# Patient Record
Sex: MALE | Employment: OTHER | URBAN - NONMETROPOLITAN AREA
[De-identification: names, ages, dates, MRNs, and addresses within clinical notes are randomized per-mention and may not be internally consistent; named-entity substitution may affect disease eponyms.]

---

## 2024-04-01 ENCOUNTER — OFFICE VISIT (OUTPATIENT)
Age: 69
End: 2024-04-01
Payer: COMMERCIAL

## 2024-04-01 VITALS
RESPIRATION RATE: 20 BRPM | DIASTOLIC BLOOD PRESSURE: 66 MMHG | HEART RATE: 77 BPM | SYSTOLIC BLOOD PRESSURE: 132 MMHG | OXYGEN SATURATION: 97 % | WEIGHT: 201.6 LBS

## 2024-04-01 DIAGNOSIS — I10 HYPERTENSION, UNSPECIFIED TYPE: ICD-10-CM

## 2024-04-01 DIAGNOSIS — D49.7 ADRENAL TUMOR: Primary | ICD-10-CM

## 2024-04-01 PROCEDURE — 1123F ACP DISCUSS/DSCN MKR DOCD: CPT | Performed by: INTERNAL MEDICINE

## 2024-04-01 PROCEDURE — 3075F SYST BP GE 130 - 139MM HG: CPT | Performed by: INTERNAL MEDICINE

## 2024-04-01 PROCEDURE — 3078F DIAST BP <80 MM HG: CPT | Performed by: INTERNAL MEDICINE

## 2024-04-01 PROCEDURE — 99204 OFFICE O/P NEW MOD 45 MIN: CPT | Performed by: INTERNAL MEDICINE

## 2024-04-01 RX ORDER — OMEPRAZOLE 20 MG/1
20 CAPSULE, DELAYED RELEASE ORAL DAILY
COMMUNITY

## 2024-04-01 RX ORDER — METOPROLOL SUCCINATE 50 MG/1
1 TABLET, EXTENDED RELEASE ORAL
COMMUNITY

## 2024-04-01 RX ORDER — LATANOPROST 50 UG/ML
1 SOLUTION/ DROPS OPHTHALMIC NIGHTLY
COMMUNITY
Start: 2023-04-12

## 2024-04-01 RX ORDER — AMLODIPINE BESYLATE 10 MG/1
10 TABLET ORAL DAILY
COMMUNITY
Start: 2024-01-12

## 2024-04-01 RX ORDER — LOSARTAN POTASSIUM 100 MG/1
100 TABLET ORAL DAILY
COMMUNITY
Start: 2024-01-12

## 2024-04-01 RX ORDER — TIMOLOL MALEATE 5 MG/ML
1 SOLUTION/ DROPS OPHTHALMIC 2 TIMES DAILY
COMMUNITY
Start: 2023-10-26

## 2024-04-01 RX ORDER — CIPROFLOXACIN 500 MG/1
500 TABLET, FILM COATED ORAL 2 TIMES DAILY
COMMUNITY
Start: 2024-03-27

## 2024-04-01 RX ORDER — SILDENAFIL 50 MG/1
1 TABLET, FILM COATED ORAL DAILY PRN
COMMUNITY

## 2024-04-01 RX ORDER — NAPROXEN 250 MG/1
400 TABLET ORAL DAILY
COMMUNITY

## 2024-04-01 RX ORDER — HYDROCHLOROTHIAZIDE 25 MG/1
1 TABLET ORAL
COMMUNITY

## 2024-04-01 ASSESSMENT — PATIENT HEALTH QUESTIONNAIRE - PHQ9
SUM OF ALL RESPONSES TO PHQ QUESTIONS 1-9: 1
SUM OF ALL RESPONSES TO PHQ QUESTIONS 1-9: 1
2. FEELING DOWN, DEPRESSED OR HOPELESS: SEVERAL DAYS
SUM OF ALL RESPONSES TO PHQ QUESTIONS 1-9: 1
SUM OF ALL RESPONSES TO PHQ9 QUESTIONS 1 & 2: 1
1. LITTLE INTEREST OR PLEASURE IN DOING THINGS: NOT AT ALL
SUM OF ALL RESPONSES TO PHQ QUESTIONS 1-9: 1

## 2024-04-01 NOTE — PROGRESS NOTES
[facility] PHYSICIANS [place] SPECIALTY  [facility] ENDOCRINOLOGY  [address] [facility] [address]  [facility] [address]  Dept: [phone]  Loc: [phone]     Visit Date: 4/1/2024    Javier Kennedy is a 68 y.o. male who presents today for:  Chief Complaint   Patient presents with    Adenoma     Left Adrenal Gland- New Patient    Hypertension     Uncontrolled             Subjective:      HPI     Javier Kennedy is a 68 y.o. , male who comes for Initial visit for adrenal tumor.  No primary care provider on file.  Javier Kennedy was diagnosed with adrenal tumor 3 months ago on a CT scan of the abdomen which showed a hypodense lesion 3 lesion in the left adrenal gland measuring 1 x 0.8 cm, likely representing an adenoma.  The patient was diagnosed with hypertension about the same time.  He is requiring multiple medications including amlodipine 10 mg, hydrochlorothiazide 25 mg, Cozaar 100 mg and metoprolol 50 mg daily be extended release formulation.  He denies any changes of his weight.  The patient denies easy bruising of the skin.  No stretch marks noted.  There is no depression.  No history of hypokalemia.  Patient denies headaches, palpitations, tremors and excessive sweatiness.      History reviewed. No pertinent past medical history.   History reviewed. No pertinent surgical history.    History reviewed. No pertinent family history.  Social History     Tobacco Use    Smoking status: Former     Current packs/day: 1.00     Average packs/day: 1 pack/day for 50.2 years (50.2 ttl pk-yrs)     Types: Cigarettes     Start date: 1974    Smokeless tobacco: Current     Types: Snuff   Substance Use Topics    Alcohol use: Not on file      Current Outpatient Medications   Medication Sig Dispense Refill    amLODIPine (NORVASC) 10 MG tablet Take 1 tablet by mouth daily      hydroCHLOROthiazide (HYDRODIURIL) 25 MG tablet Take 1 tablet by mouth Every Day      latanoprost (XALATAN) 0.005 % ophthalmic solution Apply 1 drop to eye nightly

## 2024-06-18 ENCOUNTER — INITIAL CONSULT (OUTPATIENT)
Dept: RADIATION ONCOLOGY | Age: 69
End: 2024-06-18
Payer: MEDICARE

## 2024-06-18 VITALS
TEMPERATURE: 97.2 F | DIASTOLIC BLOOD PRESSURE: 84 MMHG | HEIGHT: 65 IN | HEART RATE: 90 BPM | RESPIRATION RATE: 18 BRPM | SYSTOLIC BLOOD PRESSURE: 157 MMHG | OXYGEN SATURATION: 94 % | WEIGHT: 190.04 LBS | BODY MASS INDEX: 31.66 KG/M2

## 2024-06-18 DIAGNOSIS — C61 MALIGNANT NEOPLASM OF PROSTATE (HCC): Primary | ICD-10-CM

## 2024-06-18 PROCEDURE — 1123F ACP DISCUSS/DSCN MKR DOCD: CPT | Performed by: RADIOLOGY

## 2024-06-18 PROCEDURE — 99205 OFFICE O/P NEW HI 60 MIN: CPT | Performed by: RADIOLOGY

## 2024-06-18 NOTE — PROGRESS NOTES
[facility]          Radiation Oncology     [address] [address]  Phone: [phone] - Option 2  Fax:[phone]               INITIAL CONSULTATION    Date of Service: 2024  Patient ID: Javier Kennedy   : 1955  MRN: [id number]   Acct Number:        Requesting Provider: Dr. Josh Walker     Reason for request: Prostate cancer      CONSULTANT: Jackie Linton PhD MD      CHIEF COMPLAINT: Prostate cancer  DIAGNOSIS: C61 -- Malignant neoplasm of the prostate; Adenocarcinoma, Myriam Score 4+5=9, Grade Group 5; PSA 8.44; cT1c N0 M0, Stage IIIC, \"very high risk\" (> 4 biopsy cores with Grade Group 4 or 5)  Date of diagnosis: 3/29/2024      ASSESSMENT:  Adenocarcinoma of the prostate, \"very high risk\" as shown above.  The diagnosis including AJCC staging was reviewed.  Jaiver received a copy of his AJCC staging information.  Treatment options and recommendations including current clinical practice guidelines (NCCN) were reviewed in detail.  We reviewed the recommendations for initial diagnostic workup, risk stratification and treatment options.  The category 1 treatment option is definitive radiation therapy with 1.5 to 3 years androgen deprivation therapy.  We reviewed the recommendations for monitoring after initial treatment with the caution that the PSA will be highly suppressed due to ADT and the final result of the treatment will require long-term follow-up.  We reviewed the \"principles of radiation therapy\" section in the guideline describing the need for precision targeting, recommendation for image guided radiation therapy with fiducial marker placement and recommendation for use of a prerectal spacer for improved rectal sparing so long as there is no posterior extraprostatic extension of malignancy.  We reviewed the various treatment schedules.  Javier received a copy of the clinical practice

## 2024-06-19 ENCOUNTER — SOCIAL WORK (OUTPATIENT)
Dept: ONCOLOGY | Age: 69
End: 2024-06-19

## 2024-06-19 NOTE — PROGRESS NOTES
Oncology Social Work    Date: 6/19/2024  Time: 4:15 PM  Name: Javier Kennedy  MRN: [id number]     Contact Type: Follow-up    Note:   Situation: This staff called Javier Kennedy via phone support to introduce myself as his Oncology Social Worker.     Background:  Javier had his recent appointment at the [facility]. This staff was calling to review the Distress Thermometer he completed during this encounter.     Assessment: This staff had the opportunity to speak with Javier. He appeared very pleasant as we discussed the treatment process. He explained that he has some financial questions and is concerned about all the co-pays he is now responsible for. He just went on Medicare in May and wants to talk to his  about a better plan considering the circumstances. This staff also offered the Financial Assistance through the hospital and explained how the payment plan process could be of benefit to him should his bills come to that.   - A quick review and willingness to assist in completing his Power of  document was provided since he doesn't have the documents on file.  - Additional education regarding the services provided by the SW were explained during our conversation.   - Referred him to his area [employer] ([facility]) for additional support should he want to pursue that avenue.    Recommendation: Follow-up will be initiated by Javier based on need.  provided him with my contact information and will remain available for support.        ИРИНА Mccarthy, CELIO, BRAD  Oncology Social Worker      Electronically signed by ИРИНА Mccarthy LSW, ACHP-SW on 6/19/2024 at 4:15 PM

## 2024-06-24 DIAGNOSIS — C61 PROSTATE CANCER (HCC): Primary | ICD-10-CM

## 2024-07-09 PROBLEM — C61 MALIGNANT NEOPLASM OF PROSTATE (HCC): Status: ACTIVE | Noted: 2024-07-09

## 2024-07-15 ENCOUNTER — HOSPITAL ENCOUNTER (OUTPATIENT)
Dept: MRI IMAGING | Age: 69
Discharge: HOME | End: 2024-07-15
Attending: RADIOLOGY
Payer: MEDICARE

## 2024-07-15 DIAGNOSIS — C61 PROSTATE CANCER (HCC): ICD-10-CM

## 2024-07-15 LAB — POC CREATININE WHOLE BLOOD: 0.9 MG/DL (ref 0.5–1.2)

## 2024-07-15 PROCEDURE — 76377 3D RENDER W/INTRP POSTPROCES: CPT

## 2024-07-15 PROCEDURE — 6360000004 HC RX CONTRAST MEDICATION: Performed by: RADIOLOGY

## 2024-07-15 PROCEDURE — A9579 GAD-BASE MR CONTRAST NOS,1ML: HCPCS | Performed by: RADIOLOGY

## 2024-07-15 PROCEDURE — 82565 ASSAY OF CREATININE: CPT

## 2024-07-15 PROCEDURE — 72197 MRI PELVIS W/O & W/DYE: CPT

## 2024-07-15 RX ADMIN — GADOTERIDOL 15 ML: 279.3 INJECTION, SOLUTION INTRAVENOUS at 19:26

## 2024-07-23 ENCOUNTER — TELEPHONE (OUTPATIENT)
Dept: RADIATION ONCOLOGY | Age: 69
End: 2024-07-23

## 2024-07-23 DIAGNOSIS — C61 MALIGNANT NEOPLASM OF PROSTATE (HCC): Primary | ICD-10-CM

## 2024-07-24 RX ORDER — OXYCODONE HYDROCHLORIDE AND ACETAMINOPHEN 5; 325 MG/1; MG/1
1 TABLET ORAL EVERY 6 HOURS PRN
Qty: 2 TABLET | Refills: 0 | Status: SHIPPED | OUTPATIENT
Start: 2024-07-24 | End: 2024-07-25

## 2024-07-24 RX ORDER — LORAZEPAM 0.5 MG/1
TABLET ORAL
Qty: 2 TABLET | Refills: 0 | Status: SHIPPED | OUTPATIENT
Start: 2024-07-24 | End: 2024-09-02

## 2024-07-24 RX ORDER — CIPROFLOXACIN 500 MG/1
500 TABLET, FILM COATED ORAL EVERY 12 HOURS
Qty: 6 TABLET | Refills: 0 | Status: SHIPPED | OUTPATIENT
Start: 2024-07-24 | End: 2024-07-27

## 2025-03-11 ENCOUNTER — HOSPITAL ENCOUNTER (OUTPATIENT)
Dept: CT IMAGING | Age: 70
Discharge: HOME | End: 2025-03-11
Attending: RADIOLOGY

## 2025-03-11 DIAGNOSIS — Z00.6 ENCOUNTER FOR EXAMINATION FOR NORMAL COMPARISON OR CONTROL IN CLINICAL RESEARCH PROGRAM: ICD-10-CM

## 2025-03-17 ENCOUNTER — OFFICE VISIT (OUTPATIENT)
Dept: RADIATION ONCOLOGY | Age: 70
End: 2025-03-17
Payer: MEDICARE

## 2025-03-17 VITALS
OXYGEN SATURATION: 96 % | TEMPERATURE: 97.5 F | SYSTOLIC BLOOD PRESSURE: 132 MMHG | DIASTOLIC BLOOD PRESSURE: 78 MMHG | BODY MASS INDEX: 30.66 KG/M2 | RESPIRATION RATE: 20 BRPM | WEIGHT: 184 LBS | HEART RATE: 90 BPM

## 2025-03-17 DIAGNOSIS — C61 MALIGNANT NEOPLASM OF PROSTATE (HCC): Primary | ICD-10-CM

## 2025-03-17 PROCEDURE — 1159F MED LIST DOCD IN RCRD: CPT | Performed by: RADIOLOGY

## 2025-03-17 PROCEDURE — 99215 OFFICE O/P EST HI 40 MIN: CPT | Performed by: RADIOLOGY

## 2025-03-17 PROCEDURE — 1126F AMNT PAIN NOTED NONE PRSNT: CPT | Performed by: RADIOLOGY

## 2025-03-17 PROCEDURE — 1123F ACP DISCUSS/DSCN MKR DOCD: CPT | Performed by: RADIOLOGY

## 2025-03-17 RX ORDER — ATORVASTATIN CALCIUM 20 MG/1
20 TABLET, FILM COATED ORAL DAILY
COMMUNITY
Start: 2025-01-20

## 2025-03-17 ASSESSMENT — ENCOUNTER SYMPTOMS
DIARRHEA: 0
CONSTIPATION: 0
SHORTNESS OF BREATH: 0
BLOOD IN STOOL: 0
COUGH: 0
RECTAL PAIN: 0
ABDOMINAL PAIN: 0

## 2025-03-17 NOTE — PROGRESS NOTES
facility-administered medications for this visit.         TESTS:  PATHOLOGY:  2024: Surgical pathology:            RADIOLOGIC STUDIES:   No current for review.    LABORATORY STUDIES:    PSA History:  2025:  1.00 -> PSA confirmed detectable & increasing  2024:  0.80 <- Prostatectomy 24: GS 4+3=7, +EPE/SV, +margin, +nodes  2024:           8.44 -> Biopsy 3/29/24 -> Adenocarcinoma, GS 4+5=9 in 12 of 13 core fragments  2023:           6.19 -> ExoDx 24 -> Score 97.4  2023:           5.54          Review of Systems   Constitutional:  Negative for activity change, fatigue and unexpected weight change.   Respiratory:  Negative for cough and shortness of breath.    Cardiovascular:  Negative for chest pain.   Gastrointestinal:  Negative for abdominal pain, blood in stool, constipation, diarrhea and rectal pain.        No complaints   Genitourinary:  Positive for frequency. Negative for difficulty urinating, dysuria, hematuria and urgency.        Aua- 5 nocturia x3   Does Kegel exeresis daily 5 set of 10    Neurological:  Negative for weakness, numbness and headaches.     A complete review of systems was performed and found to be negative except as presented above.      EXAMINATION:   GENERAL: Well-developed adult male, alert and oriented ×3 in no obvious distress.  Clear mentation with appropriate affect.  VITAL SIGNS:  /78 (BP Site: Left Upper Arm, Patient Position: Sitting)   Pulse 90   Temp 97.5 °F (36.4 °C) (Infrared)   Resp 20   Wt 83.5 kg (184 lb)   SpO2 96%   BMI 30.66 kg/m²   PAIN: 0/10  ECO  HEENT: Atraumatic, normocephalic.  PERRL/EOMI; ears, nose and lips unremarkable on external examination;   NECK: No JVD.  No palpable cervical lymphadenopathy.   THORAX: No palpable supraclavicular or axillary lymphadenopathy.   LUNGS: Clear to auscultation.  HEART: Non-tachycardic, regular  ABDOMEN:  unremarkable bowel sounds.   EXTREMITIES: No clubbing or cyanosis.

## 2025-03-28 ENCOUNTER — HOSPITAL ENCOUNTER (OUTPATIENT)
Dept: MRI IMAGING | Age: 70
Discharge: HOME | End: 2025-03-28
Attending: RADIOLOGY

## 2025-03-28 DIAGNOSIS — Z00.6 EXAMINATION FOR NORMAL COMPARISON FOR CLINICAL RESEARCH: ICD-10-CM

## 2025-04-15 ENCOUNTER — OFFICE VISIT (OUTPATIENT)
Dept: RADIATION ONCOLOGY | Age: 70
End: 2025-04-15

## 2025-04-15 VITALS
DIASTOLIC BLOOD PRESSURE: 81 MMHG | HEART RATE: 72 BPM | RESPIRATION RATE: 20 BRPM | BODY MASS INDEX: 31.26 KG/M2 | SYSTOLIC BLOOD PRESSURE: 124 MMHG | WEIGHT: 187.61 LBS | TEMPERATURE: 97.7 F | OXYGEN SATURATION: 98 %

## 2025-04-15 DIAGNOSIS — C61 MALIGNANT NEOPLASM OF PROSTATE (HCC): Primary | ICD-10-CM

## 2025-04-15 NOTE — PROGRESS NOTES
[facility]          Radiation Oncology     [address] [place] [address]  Phone: [phone] - Option 2  Fax:[phone]               Dr. Quirino Godoy MD MS        Dr. Jackie Linton PhD MD       On Treatment Visit Note (OTV)    Date of Service: 4/15/2025  Patient ID: Javier Kennedy   : 1955  MRN: [id number]   Acct Number:        RADIATION ONCOLOGY ATTENDING:  Jackie Linton PhD MD    DIAGNOSIS:   Cancer Staging   Malignant neoplasm of prostate (HCC)  Staging form: Prostate, AJCC 8th Edition  - Clinical stage from 2024: Stage IIIC (cT1c, cN0, cM0, PSA: 8.4, Grade Group: 5) - Signed by Jackie Linton MD on 2024  - Pathologic stage from 3/17/2025: Stage MELISA (rpT3b, pN1, cM0, PSA: 8.4, Grade Group: 3) - Signed by Jackie Linton MD on 3/17/2025  Staging comments: SV invasion & EPE on pre-op MRI. Initial post-op PSA 0.8. PSADT 4.8 months      Treatment Area: Pelvis- Male    Current Total Dose(cGy): 180  Current Fraction:   Final/Cumulative Rx. Dose (cGy): 6840    Patient was seen today for weekly visit.     Wt Readings from Last 3 Encounters:   04/15/25 85.1 kg (187 lb 9.8 oz)   25 83.5 kg (184 lb)   24 86.2 kg (190 lb 0.6 oz)       /81 (BP Site: Left Upper Arm, Patient Position: Sitting)   Pulse 72   Temp 97.7 °F (36.5 °C) (Infrared)   Resp 20   Wt 85.1 kg (187 lb 9.8 oz)   SpO2 98%   BMI 31.26 kg/m²     No results found for: \"WBC\", \"HGB\", \"HCT\", \"PLT\"    Comfort Alteration  Fatigue:None, able to perform daily activities    Pain Location: none  Pain Intensity (Current): 0 No Pain  Pain Treatment: N/A  Pain Relief: n/a    Emotional Alteration:   Coping: effective    Nutritional Alteration  Anorexia: none   Nausea: No nausea noted  Vomiting: No vomiting     Skin Alteration   Skin reaction: No changes noted    Elimination Alterations  Urinary Frequency:

## 2025-04-22 ENCOUNTER — OFFICE VISIT (OUTPATIENT)
Dept: RADIATION ONCOLOGY | Age: 70
End: 2025-04-22

## 2025-04-22 VITALS
TEMPERATURE: 97.7 F | DIASTOLIC BLOOD PRESSURE: 78 MMHG | RESPIRATION RATE: 20 BRPM | HEART RATE: 76 BPM | OXYGEN SATURATION: 98 % | BODY MASS INDEX: 30.96 KG/M2 | SYSTOLIC BLOOD PRESSURE: 143 MMHG | WEIGHT: 185.85 LBS

## 2025-04-22 DIAGNOSIS — C61 MALIGNANT NEOPLASM OF PROSTATE (HCC): Primary | ICD-10-CM

## 2025-04-22 NOTE — PROGRESS NOTES
[facility]          Radiation Oncology     [address] [place] [address]  Phone: [phone] - Option 2  Fax:[phone]               Dr. Quirino Godoy MD MS        Dr. Jackie Linton PhD MD       On Treatment Visit Note (OTV)    Date of Service: 2025  Patient ID: Javier Kennedy   : 1955  MRN: [id number]   Acct Number:        RADIATION ONCOLOGY ATTENDING:  Jackie Linton PhD MD    DIAGNOSIS:   Cancer Staging   Malignant neoplasm of prostate (HCC)  Staging form: Prostate, AJCC 8th Edition  - Clinical stage from 2024: Stage IIIC (cT1c, cN0, cM0, PSA: 8.4, Grade Group: 5) - Signed by Jackie Lintno MD on 2024  - Pathologic stage from 3/17/2025: Stage MELISA (rpT3b, pN1, cM0, PSA: 8.4, Grade Group: 3) - Signed by Jackie Linton MD on 3/17/2025  Staging comments: SV invasion & EPE on pre-op MRI. Initial post-op PSA 0.8. PSADT 4.8 months      Treatment Area: Pelvis- Male    Current Total Dose(cGy): 1080  Current Fraction:   Final/Cumulative Rx. Dose (cGy): 6840    Patient was seen today for weekly visit.     Wt Readings from Last 3 Encounters:   25 84.3 kg (185 lb 13.6 oz)   04/15/25 85.1 kg (187 lb 9.8 oz)   25 83.5 kg (184 lb)       BP (!) 143/78 (BP Site: Left Upper Arm, Patient Position: Sitting)   Pulse 76   Temp 97.7 °F (36.5 °C) (Infrared)   Resp 20   Wt 84.3 kg (185 lb 13.6 oz)   SpO2 98%   BMI 30.96 kg/m²     No results found for: \"WBC\", \"HGB\", \"HCT\", \"PLT\"    Comfort Alteration  Fatigue:None, able to perform daily activities    Pain Location: none  Pain Intensity (Current): 0 No Pain  Pain Treatment: N/A  Pain Relief: n/a    Emotional Alteration:   Coping: effective    Nutritional Alteration  Anorexia: none   Nausea: No nausea noted  Vomiting: No vomiting     Skin Alteration   Skin reaction: No changes noted    Elimination Alterations  Urinary

## 2025-04-29 ENCOUNTER — OFFICE VISIT (OUTPATIENT)
Dept: RADIATION ONCOLOGY | Age: 70
End: 2025-04-29

## 2025-04-29 VITALS
HEART RATE: 75 BPM | SYSTOLIC BLOOD PRESSURE: 142 MMHG | TEMPERATURE: 97.7 F | WEIGHT: 186.07 LBS | DIASTOLIC BLOOD PRESSURE: 73 MMHG | BODY MASS INDEX: 31 KG/M2 | OXYGEN SATURATION: 98 % | RESPIRATION RATE: 20 BRPM

## 2025-04-29 DIAGNOSIS — C61 MALIGNANT NEOPLASM OF PROSTATE (HCC): Primary | ICD-10-CM

## 2025-04-29 NOTE — PROGRESS NOTES
[facility]          Radiation Oncology     [address] [place] [address]  Phone: [phone] - Option 2  Fax:[phone]               Dr. Quirino Godoy MD MS        Dr. Jackie Linton PhD MD       On Treatment Visit Note (OTV)    Date of Service: 2025  Patient ID: Javier Kennedy   : 1955  MRN: [id number]   Acct Number:        RADIATION ONCOLOGY ATTENDING:  Jackie Linton PhD MD    DIAGNOSIS:   Cancer Staging   Malignant neoplasm of prostate (HCC)  Staging form: Prostate, AJCC 8th Edition  - Clinical stage from 2024: Stage IIIC (cT1c, cN0, cM0, PSA: 8.4, Grade Group: 5) - Signed by Jackie Linton MD on 2024  - Pathologic stage from 3/17/2025: Stage MELISA (rpT3b, pN1, cM0, PSA: 8.4, Grade Group: 3) - Signed by Jackie Linton MD on 3/17/2025  Staging comments: SV invasion & EPE on pre-op MRI. Initial post-op PSA 0.8. PSADT 4.8 months      Treatment Area: Pelvis- Male    Current Total Dose(cGy):   Current Fraction:   Final/Cumulative Rx. Dose (cGy): 6840    Patient was seen today for weekly visit.     Wt Readings from Last 3 Encounters:   25 84.4 kg (186 lb 1.1 oz)   25 84.3 kg (185 lb 13.6 oz)   04/15/25 85.1 kg (187 lb 9.8 oz)       BP (!) 142/73 (BP Site: Left Upper Arm, Patient Position: Sitting)   Pulse 75   Temp 97.7 °F (36.5 °C) (Infrared)   Resp 20   Wt 84.4 kg (186 lb 1.1 oz)   SpO2 98%   BMI 31.00 kg/m²     No results found for: \"WBC\", \"HGB\", \"HCT\", \"PLT\"    Comfort Alteration  Fatigue:None, able to perform daily activities    Pain Location: none  Pain Intensity (Current): 0 No Pain  Pain Treatment: N/A  Pain Relief: n/a    Emotional Alteration:   Coping: effective    Nutritional Alteration  Anorexia: none   Nausea: No nausea noted  Vomiting: No vomiting     Skin Alteration   Skin reaction: No changes noted    Elimination

## 2025-05-06 ENCOUNTER — OFFICE VISIT (OUTPATIENT)
Dept: RADIATION ONCOLOGY | Age: 70
End: 2025-05-06

## 2025-05-06 VITALS
RESPIRATION RATE: 18 BRPM | SYSTOLIC BLOOD PRESSURE: 148 MMHG | OXYGEN SATURATION: 98 % | DIASTOLIC BLOOD PRESSURE: 73 MMHG | BODY MASS INDEX: 30.78 KG/M2 | WEIGHT: 184.75 LBS | HEART RATE: 72 BPM | TEMPERATURE: 97.5 F

## 2025-05-06 DIAGNOSIS — C61 MALIGNANT NEOPLASM OF PROSTATE (HCC): Primary | ICD-10-CM

## 2025-05-06 NOTE — PROGRESS NOTES
[facility]          Radiation Oncology     [address] [place] [address]  Phone: [phone] - Option 2  Fax:[phone]               Dr. Quirino Godoy MD MS        Dr. Jackie Linton PhD MD       On Treatment Visit Note (OTV)    Date of Service: 2025  Patient ID: Javier Kennedy   : 1955  MRN: [id number]   Acct Number:        RADIATION ONCOLOGY ATTENDING:  Jackie Linton PhD MD    DIAGNOSIS:   Cancer Staging   Malignant neoplasm of prostate (HCC)  Staging form: Prostate, AJCC 8th Edition  - Clinical stage from 2024: Stage IIIC (cT1c, cN0, cM0, PSA: 8.4, Grade Group: 5) - Signed by Jackie Linton MD on 2024  - Pathologic stage from 3/17/2025: Stage MELISA (rpT3b, pN1, cM0, PSA: 8.4, Grade Group: 3) - Signed by Jackie Linton MD on 3/17/2025  Staging comments: SV invasion & EPE on pre-op MRI. Initial post-op PSA 0.8. PSADT 4.8 months      Treatment Area: Pelvis- Male    Current Total Dose(cGy): 2880  Current Fraction: 1638  Final/Cumulative Rx. Dose (cGy): 6840    Patient was seen today for weekly visit.     Wt Readings from Last 3 Encounters:   25 83.8 kg (184 lb 11.9 oz)   25 84.4 kg (186 lb 1.1 oz)   25 84.3 kg (185 lb 13.6 oz)       BP (!) 148/73 (BP Site: Left Upper Arm, Patient Position: Sitting)   Pulse 72   Temp 97.5 °F (36.4 °C) (Infrared)   Resp 18   Wt 83.8 kg (184 lb 11.9 oz)   SpO2 98%   BMI 30.78 kg/m²     No results found for: \"WBC\", \"HGB\", \"HCT\", \"PLT\"    Comfort Alteration  Fatigue:Able to perform daily activities with rest periods    Pain Location: none  Pain Intensity (Current): 0 No Pain  Pain Treatment: N/A  Pain Relief: n/a    Emotional Alteration:   Coping: effective    Nutritional Alteration  Anorexia: none   Nausea: No nausea noted  Vomiting: No vomiting     Skin Alteration   Skin reaction: No changes noted    Elimination

## 2025-05-13 ENCOUNTER — OFFICE VISIT (OUTPATIENT)
Dept: RADIATION ONCOLOGY | Age: 70
End: 2025-05-13
Payer: MEDICARE

## 2025-05-13 VITALS
HEART RATE: 82 BPM | SYSTOLIC BLOOD PRESSURE: 150 MMHG | BODY MASS INDEX: 30.56 KG/M2 | DIASTOLIC BLOOD PRESSURE: 74 MMHG | TEMPERATURE: 97.5 F | OXYGEN SATURATION: 98 % | RESPIRATION RATE: 20 BRPM | WEIGHT: 183.42 LBS

## 2025-05-13 DIAGNOSIS — C61 MALIGNANT NEOPLASM OF PROSTATE (HCC): Primary | ICD-10-CM

## 2025-05-13 PROCEDURE — 77427 RADIATION TX MANAGEMENT X5: CPT | Performed by: RADIOLOGY

## 2025-05-13 NOTE — PROGRESS NOTES
[facility]          Radiation Oncology     [address] [place] [address]  Phone: [phone] - Option 2  Fax:[phone]               Dr. Quirino Godoy MD MS        Dr. Jackie Linton PhD MD       On Treatment Visit Note (OTV)    Date of Service: 2025  Patient ID: Javier Kennedy   : 1955  MRN: [id number]   Acct Number:        RADIATION ONCOLOGY ATTENDING:  Jcakie Linton PhD MD    DIAGNOSIS:   Cancer Staging   Malignant neoplasm of prostate (HCC)  Staging form: Prostate, AJCC 8th Edition  - Clinical stage from 2024: Stage IIIC (cT1c, cN0, cM0, PSA: 8.4, Grade Group: 5) - Signed by Jackie Linton MD on 2024  - Pathologic stage from 3/17/2025: Stage MELISA (rpT3b, pN1, cM0, PSA: 8.4, Grade Group: 3) - Signed by Jackie Linton MD on 3/17/2025  Staging comments: SV invasion & EPE on pre-op MRI. Initial post-op PSA 0.8. PSADT 4.8 months      Treatment Area: Pelvis- Male    Current Total Dose(cGy): 3880  Current Fraction:   Final/Cumulative Rx. Dose (cGy): 6840    Patient was seen today for weekly visit.     Wt Readings from Last 3 Encounters:   25 83.2 kg (183 lb 6.8 oz)   25 83.8 kg (184 lb 11.9 oz)   25 84.4 kg (186 lb 1.1 oz)       BP (!) 150/74 (BP Site: Left Upper Arm, Patient Position: Sitting)   Pulse 82   Temp 97.5 °F (36.4 °C) (Infrared)   Resp 20   Wt 83.2 kg (183 lb 6.8 oz)   SpO2 98%   BMI 30.56 kg/m²     No results found for: \"WBC\", \"HGB\", \"HCT\", \"PLT\"    Comfort Alteration  Fatigue:Able to perform daily activities with rest periods    Pain Location: none  Pain Intensity (Current): 0 No Pain  Pain Treatment: N/A  Pain Relief: n/a    Emotional Alteration:   Coping: effective    Nutritional Alteration  Anorexia: none   Nausea: No nausea noted  Vomiting: No vomiting     Skin Alteration   Skin reaction: No changes noted    Elimination

## 2025-05-18 NOTE — PROGRESS NOTES
color of the skin, or any other cutaneous complaints.  Infectious:  Denies any fevers, any recent history of pneumonia, urinary infection or any other history of an ongoing infection.  Lymphatic:  Denies any recent development of palpable or visible adenopathy.     Objective  Vitals:    05/19/25 1348   BP: (!) 153/75   Pulse: 78   Resp: 16   Temp: 98.3 °F (36.8 °C)   SpO2: 96%        Performance Status  ECOG 1    EXAM  General: No acute distress; sitting upright in a chair, speaking in full sentences  Head: normocephalic, atraumatic  EENT: sclera clear , no proptosis or lid lag; nose patent, posterior oropharynx without exudates or erythema, range of motion of neck intact   Cardiac: no LE edema  Lungs: no audible wheezing or cough , on room air   Abdomen: soft, NTND  Neuro: AAOx3, follows commands, speech intact , normal hearing   Skin: no obvious rashes   : deferred, no urinary compliants, no CVA tenderness per pt  MSK: moving upper extremities well , ROM intact. facial muscles appear symmetrical   Heme: no obvious bruising, no pallor    Psych: appropriate mood & affect       LABORATORIES AND STUDIES: reviewed by me personally    Assessment & Plan      Javier Kennedy is a  70 y.o. male with prostate adenocarcinoma, [place] Score 4+3=7, Grade Group 3; PSA at diagnosis 8.44;  pT3b pN1 M0, pathologic stage MELISA initial post-operative PSA 0.8     Cancer Staging   Malignant neoplasm of prostate (HCC)  Staging form: Prostate, AJCC 8th Edition  - Clinical stage from 6/18/2024: Stage IIIC (cT1c, cN0, cM0, PSA: 8.4, Grade Group: 5) - Signed by Jackie Linton MD on 7/9/2024  - Pathologic stage from 3/17/2025: Stage MLEISA (rpT3b, pN1, cM0, PSA: 8.4, Grade Group: 3) - Signed by Jackie Linton MD on 3/17/2025  Staging comments: SV invasion & EPE on pre-op MRI. Initial post-op PSA 0.8. PSADT 4.8 months     -1/2023: PSA of 5.54 in January 2023.  -PSA 1/12/2024 was further elevated at 8.44.  -ExoDx testing 1/17/2024

## 2025-05-19 ENCOUNTER — OFFICE VISIT (OUTPATIENT)
Dept: ONCOLOGY | Age: 70
End: 2025-05-19
Payer: MEDICARE

## 2025-05-19 ENCOUNTER — HOSPITAL ENCOUNTER (OUTPATIENT)
Dept: INFUSION THERAPY | Age: 70
Discharge: HOME | End: 2025-05-19
Payer: MEDICARE

## 2025-05-19 VITALS
SYSTOLIC BLOOD PRESSURE: 153 MMHG | RESPIRATION RATE: 16 BRPM | DIASTOLIC BLOOD PRESSURE: 75 MMHG | BODY MASS INDEX: 30.82 KG/M2 | OXYGEN SATURATION: 96 % | TEMPERATURE: 98.3 F | HEIGHT: 65 IN | HEART RATE: 78 BPM | WEIGHT: 185 LBS

## 2025-05-19 VITALS
DIASTOLIC BLOOD PRESSURE: 75 MMHG | SYSTOLIC BLOOD PRESSURE: 153 MMHG | RESPIRATION RATE: 16 BRPM | HEART RATE: 78 BPM | TEMPERATURE: 98.3 F | OXYGEN SATURATION: 96 %

## 2025-05-19 DIAGNOSIS — C61 MALIGNANT NEOPLASM OF PROSTATE (HCC): Primary | ICD-10-CM

## 2025-05-19 PROCEDURE — 1159F MED LIST DOCD IN RCRD: CPT | Performed by: STUDENT IN AN ORGANIZED HEALTH CARE EDUCATION/TRAINING PROGRAM

## 2025-05-19 PROCEDURE — 1126F AMNT PAIN NOTED NONE PRSNT: CPT | Performed by: STUDENT IN AN ORGANIZED HEALTH CARE EDUCATION/TRAINING PROGRAM

## 2025-05-19 PROCEDURE — 99205 OFFICE O/P NEW HI 60 MIN: CPT | Performed by: STUDENT IN AN ORGANIZED HEALTH CARE EDUCATION/TRAINING PROGRAM

## 2025-05-19 PROCEDURE — 99211 OFF/OP EST MAY X REQ PHY/QHP: CPT

## 2025-05-19 PROCEDURE — 1123F ACP DISCUSS/DSCN MKR DOCD: CPT | Performed by: STUDENT IN AN ORGANIZED HEALTH CARE EDUCATION/TRAINING PROGRAM

## 2025-05-19 RX ORDER — TADALAFIL 5 MG/1
5 TABLET ORAL DAILY
COMMUNITY

## 2025-05-19 RX ORDER — ABIRATERONE 500 MG/1
1000 TABLET ORAL DAILY
Qty: 60 TABLET | Refills: 3 | Status: ACTIVE | OUTPATIENT
Start: 2025-05-19

## 2025-05-19 RX ORDER — PREDNISOLONE 5 MG/1
5 TABLET ORAL DAILY
Qty: 90 TABLET | Refills: 2 | Status: SHIPPED | OUTPATIENT
Start: 2025-05-19

## 2025-05-19 RX ORDER — VIT C/B6/B5/MAGNESIUM/HERB 173 50-5-6-5MG
500 CAPSULE ORAL DAILY
COMMUNITY

## 2025-05-20 ENCOUNTER — OFFICE VISIT (OUTPATIENT)
Dept: RADIATION ONCOLOGY | Age: 70
End: 2025-05-20

## 2025-05-20 ENCOUNTER — SOCIAL WORK (OUTPATIENT)
Dept: ONCOLOGY | Age: 70
End: 2025-05-20

## 2025-05-20 VITALS
SYSTOLIC BLOOD PRESSURE: 141 MMHG | OXYGEN SATURATION: 98 % | WEIGHT: 181.22 LBS | HEART RATE: 69 BPM | BODY MASS INDEX: 30.25 KG/M2 | RESPIRATION RATE: 20 BRPM | DIASTOLIC BLOOD PRESSURE: 71 MMHG | TEMPERATURE: 97.7 F

## 2025-05-20 DIAGNOSIS — C61 MALIGNANT NEOPLASM OF PROSTATE (HCC): Primary | ICD-10-CM

## 2025-05-20 NOTE — PROGRESS NOTES
Oncology Social Work    Date: 5/20/2025  Time: 2:38 PM  Name: Javier Kennedy  MRN: [id number]     Contact Type: Distress Tool Follow-up    Note:   Situation: This staff called Javier Kennedy via phone support to introduce myself as the Oncology Social Worker.     Background: Javier was referred to this staff by the [facility] after a recent appointment here. This staff was calling to review the Distress Thermometer provided during their visit.    Coping Score 0    Areas of Concern Identified    Physical Concern: Tobacco use / Substance use    Expressive Concern: None selected    Social Concern: None selected    Practical Concern: Taking care of myself and Finances or ability to pay for care    Existential Concern: None selected    Assessment: The contact number provided to this staff and Medical Records is the correct number as identified in the voicemail recording. Since the call went to a voicemail, a message was left about the call's purpose.   - Education explaining the 's assistance opportunities was provided on the recorded message left.  - A quick review and willingness to assist in completing the Power of  document was provided since it does not appear in his Medical Record.  - No community referrals were placed at this time because there was no conversation in regards to the treatment plan. Referral services may be considered should there be expressed needs regarding assistance by Him or his family.    Recommendation: Follow-up will be initiated based on need.  provided my contact information and will remain available for support.        ИРИНА Mccarthy, CELIO, ONC-C, BRAD  Oncology Social Worker      Electronically signed by ИРИНА Mccarthy LSW, ACHP-SW on 5/20/2025 at 2:38 PM

## 2025-05-20 NOTE — PROGRESS NOTES
mouth daily      sildenafil (VIAGRA) 50 MG tablet Take 1 tablet by mouth daily as needed for Erectile Dysfunction      timolol (TIMOPTIC) 0.5 % ophthalmic solution Place 1 drop into both eyes in the morning and at bedtime       No current facility-administered medications for this visit.     * New    PHYSICAL EXAM:       ECO - Asymptomatic (Fully active, able to carry on all pre-disease activities without restriction)     General: NAD, AO x 3, Mentation is clear with appropriate affect.  HEENT: Normocephalic, atraumatic  Thorax:  Unlabored  Abdomen:  Non-distended  Neuro:  Cranial nerves grossly intact; no focal deficits    Chemotherapy Update: Concurrent ADT - Lupron 7.5mg initiated 2025 -> Referred to med onc to consider abiraterone/other agent     Treatment Imaging: CBCT    ASSESSMENT: Modest radiation side effects mostly some looser stools which is managed with imodium. Reports he didn't need any imodium yesterday and \"paid for it\" today. Took 3 imodium and stools seems normal now, advised judicious use to avoid constipation.  No rectal pain/burning reported. Mildly increased urinary frequency and nocturia without dysuria/hematuria. We discussed initiation of Flomax to see if this improves urinary emptying and subsequently reduces frequency/nocturia, but he prefers to monitor for time being     New medications, diagnostic results: No new images, medications, or changes to current recommendations. Titrating imodium to needs    PLAN: Again reviewed potential side effects of radiation for the patient's treatment.    Continue local/topical care.    Continue current radiation course as prescribed.

## 2025-05-27 ENCOUNTER — OFFICE VISIT (OUTPATIENT)
Dept: RADIATION ONCOLOGY | Age: 70
End: 2025-05-27
Payer: MEDICARE

## 2025-05-27 VITALS
DIASTOLIC BLOOD PRESSURE: 87 MMHG | WEIGHT: 179.01 LBS | RESPIRATION RATE: 20 BRPM | HEART RATE: 73 BPM | OXYGEN SATURATION: 98 % | TEMPERATURE: 97.7 F | SYSTOLIC BLOOD PRESSURE: 137 MMHG | BODY MASS INDEX: 29.88 KG/M2

## 2025-05-27 DIAGNOSIS — C61 MALIGNANT NEOPLASM OF PROSTATE (HCC): Primary | ICD-10-CM

## 2025-05-27 NOTE — PROGRESS NOTES
[facility]          Radiation Oncology     [address] [address]  Phone: [phone] - Option 2  Fax:[phone]               Dr. Quirino Godoy MD MS        Dr. Jackie Linton PhD MD       On Treatment Visit Note (OTV)    Date of Service: 2025  Patient ID: Javier Kennedy   : 1955  MRN: [id number]   Acct Number:        RADIATION ONCOLOGY ATTENDING:  Jackie Linton PhD MD    DIAGNOSIS:   Cancer Staging   Malignant neoplasm of prostate (HCC)  Staging form: Prostate, AJCC 8th Edition  - Clinical stage from 2024: Stage IIIC (cT1c, cN0, cM0, PSA: 8.4, Grade Group: 5) - Signed by Jackie Linton MD on 2024  - Pathologic stage from 3/17/2025: Stage MELISA (rpT3b, pN1, cM0, PSA: 8.4, Grade Group: 3) - Signed by Jackie Linton MD on 3/17/2025  Staging comments: SV invasion & EPE on pre-op MRI. Initial post-op PSA 0.8. PSADT 4.8 months      Treatment Area: Pelvis- Male    Current Total Dose(cGy): 5400  Current Fraction: 30/38  Final/Cumulative Rx. Dose (cGy): 6840    Patient was seen today for weekly visit.     Wt Readings from Last 3 Encounters:   25 81.2 kg (179 lb 0.2 oz)   25 82.2 kg (181 lb 3.5 oz)   25 83.9 kg (185 lb)       /87 (BP Site: Left Upper Arm, Patient Position: Sitting)   Pulse 73   Temp 97.7 °F (36.5 °C) (Infrared)   Resp 20   Wt 81.2 kg (179 lb 0.2 oz)   SpO2 98%   BMI 29.88 kg/m²     No results found for: \"WBC\", \"HGB\", \"HCT\", \"PLT\"    Comfort Alteration  Fatigue:Able to perform daily activities with rest periods    Pain Location: none  Pain Intensity (Current): 0 No Pain  Pain Treatment: N/A  Pain Relief: n/a    Emotional Alteration:   Coping: effective    Nutritional Alteration  Anorexia: none   Nausea: No nausea noted  Vomiting: No vomiting     Skin Alteration   Skin reaction: No changes noted    Elimination

## 2025-05-28 ENCOUNTER — TELEPHONE (OUTPATIENT)
Dept: RADIATION ONCOLOGY | Age: 70
End: 2025-05-28

## 2025-05-28 ENCOUNTER — CLINICAL DOCUMENTATION (OUTPATIENT)
Dept: CASE MANAGEMENT | Age: 70
End: 2025-05-28

## 2025-05-28 PROCEDURE — 77427 RADIATION TX MANAGEMENT X5: CPT | Performed by: RADIOLOGY

## 2025-05-28 NOTE — TELEPHONE ENCOUNTER
Nutrition Assessment    Reason for Visit: Oncology Nutrition Screen Follow up. Score- 13, High risk   Concerns- weight loss, loss of appetite, mouth sores, taste changes, dry mouth, eating less.    This patient is getting treatment in [place].   Left Message on Pt home and cell phone to call back.      Wt Readings from Last 3 Encounters:   05/27/25 81.2 kg (179 lb 0.2 oz)   05/20/25 82.2 kg (181 lb 3.5 oz)   05/19/25 83.9 kg (185 lb)        Signed:  Contact number: [phone]

## 2025-05-28 NOTE — PROGRESS NOTES
Name: Javier Kennedy  : 1955  MRN: [id number]    Oncology Navigation Follow-Up Note    Contact Type:  Telephone    Called [facility] [phone] with patient present and discussed copay assistance - anila obtained through [facility] with zero copay  and is good through 2026   Electronically signed by Anna Grant RN on 2025 at 2:06 PM

## 2025-05-29 RX ORDER — PREDNISOLONE 5 MG/1
5 TABLET ORAL DAILY
Qty: 90 TABLET | Refills: 2 | Status: SHIPPED | OUTPATIENT
Start: 2025-05-29

## 2025-05-30 ENCOUNTER — TELEPHONE (OUTPATIENT)
Dept: ONCOLOGY | Age: 70
End: 2025-05-30

## 2025-05-30 NOTE — TELEPHONE ENCOUNTER
Left message that he would be receiving his Abiraterone Acetate 500 mg on 05-30-25.     Javier also had a question that Dr. Donahue mentioned about a steroid. When he received call about delivery they do not say anything about another medication.    I (Dr. Donahue) ordered it on 5/19 to be delivered too from [facility].    if they didn't deliver it, I sent a new prescription that he can  from [facility] .    Called patient back, but no answer. Left message for patient. And if he had any further questions to please reach out to our office so that we may help him.

## 2025-06-02 ENCOUNTER — TELEPHONE (OUTPATIENT)
Dept: RADIATION ONCOLOGY | Age: 70
End: 2025-06-02

## 2025-06-02 NOTE — TELEPHONE ENCOUNTER
Nutrition Assessment    Reason for Visit: High risk nutrition screen    Nutrition Recommendations:   -Choose protein at each meal  - Maintain weight  Malnutrition Assessment:   Malnutrition Status: No malnutrition  Context:  Findings of the 6 clinical characteristics of malnutrition (minimum of 2 out of 6 clinical characteristics is required to make the dx of moderate or severe Protein Calorie Malnutrition based on AND/ASPEN Guidelines):   1. Energy Intake:%   2. Weight Loss:    3. Fat Loss:   4. Muscle Loss:   5. Fluid Accumulation:   6.  Strength:    Nutrition Diagnosis:   Problem: Nutrition related knowledge deficit  Etiology: Prostate cancer  Signs and Symptoms: not following cancer prevention diet    Nutrition Assessment:   History: Prostate cancer  Subjective: Pt states that he is eating well and doesn't have any concerns. Pt finished radiation therapy on Friday and will begin hormone blocker therapy. Staying away from too many carbs and lunch meats.   Current Nutrition:   Oral Diet: Regular   Oral Diet Intake:    %    Oral Nutrition Supplement (ONS): None   ONS intake:    N/A  Anthropometric Measures:     Wt Readings from Last 3 Encounters:   05/27/25 81.2 kg (179 lb 0.2 oz)   05/20/25 82.2 kg (181 lb 3.5 oz)   05/19/25 83.9 kg (185 lb)       Usual Weight:      Ideal Weight:      BMI: 29.88  Comparative Standards:      Estimated calorie needs:   2025kcal/d (25kcal/kg)   Estimated protein needs:    81g/d (1g/kg)    Nutrition Interventions:     - Eat % of meals  - Follow cancer preventative diet.   - Eat 80g PRO/d  Nutrition Evaluation:          Evaluation: Goals set       Goals: Patient will consume 75% or greater of normal intake and maintain weight throughout treatment.         Monitoring: Oral intake, diet tolerance, weight,     Signed: Beth Manning RDN  Contact number: [phone]

## 2025-06-03 ENCOUNTER — OFFICE VISIT (OUTPATIENT)
Dept: RADIATION ONCOLOGY | Age: 70
End: 2025-06-03

## 2025-06-03 VITALS
BODY MASS INDEX: 30.1 KG/M2 | OXYGEN SATURATION: 98 % | TEMPERATURE: 97.7 F | WEIGHT: 180.34 LBS | HEART RATE: 74 BPM | RESPIRATION RATE: 18 BRPM | DIASTOLIC BLOOD PRESSURE: 73 MMHG | SYSTOLIC BLOOD PRESSURE: 139 MMHG

## 2025-06-03 DIAGNOSIS — C61 MALIGNANT NEOPLASM OF PROSTATE (HCC): Primary | ICD-10-CM

## 2025-06-03 NOTE — PROGRESS NOTES
[facility]          Radiation Oncology     [address] [place] [address]  Phone: [phone] - Option 2  Fax:[phone]               Dr. Quirino Godoy MD MS        Dr. Jackie Linton PhD MD       On Treatment Visit Note (OTV)    Date of Service: 6/3/2025  Patient ID: Javier Kennedy   : 1955  MRN: [id number]   Acct Number:        RADIATION ONCOLOGY ATTENDING:  Jackie Linton PhD MD    DIAGNOSIS:   Cancer Staging   Malignant neoplasm of prostate (HCC)  Staging form: Prostate, AJCC 8th Edition  - Clinical stage from 2024: Stage IIIC (cT1c, cN0, cM0, PSA: 8.4, Grade Group: 5) - Signed by Jackie Linton MD on 2024  - Pathologic stage from 3/17/2025: Stage MELISA (rpT3b, pN1, cM0, PSA: 8.4, Grade Group: 3) - Signed by Jackie Linton MD on 3/17/2025  Staging comments: SV invasion & EPE on pre-op MRI. Initial post-op PSA 0.8. PSADT 4.8 months      Treatment Area: Pelvis- Male    Current Total Dose(cGy): 6120  Current Fraction: 34/38  Final/Cumulative Rx. Dose (cGy): 6840    Patient was seen today for weekly visit.     Wt Readings from Last 3 Encounters:   25 81.8 kg (180 lb 5.4 oz)   25 81.2 kg (179 lb 0.2 oz)   25 82.2 kg (181 lb 3.5 oz)       /73 (BP Site: Left Upper Arm, Patient Position: Sitting)   Pulse 74   Temp 97.7 °F (36.5 °C) (Infrared)   Resp 18   Wt 81.8 kg (180 lb 5.4 oz)   SpO2 98%   BMI 30.10 kg/m²     No results found for: \"WBC\", \"HGB\", \"HCT\", \"PLT\"    Comfort Alteration  Fatigue:Able to perform daily activities with rest periods    Pain Location: none  Pain Intensity (Current): 0 No Pain  Pain Treatment: N/A  Pain Relief: n/a    Emotional Alteration:   Coping: effective    Nutritional Alteration  Anorexia: none   Nausea: No nausea noted  Vomiting: No vomiting     Skin Alteration   Skin reaction: No changes noted    Elimination

## 2025-08-13 DIAGNOSIS — C61 MALIGNANT NEOPLASM OF PROSTATE (HCC): Primary | ICD-10-CM

## 2025-08-18 DIAGNOSIS — C61 MALIGNANT NEOPLASM OF PROSTATE (HCC): Primary | ICD-10-CM

## 2025-08-19 ENCOUNTER — OFFICE VISIT (OUTPATIENT)
Dept: RADIATION ONCOLOGY | Age: 70
End: 2025-08-19

## 2025-08-19 ENCOUNTER — OFFICE VISIT (OUTPATIENT)
Dept: ONCOLOGY | Age: 70
End: 2025-08-19
Payer: MEDICARE

## 2025-08-19 ENCOUNTER — HOSPITAL ENCOUNTER (OUTPATIENT)
Dept: INFUSION THERAPY | Age: 70
Discharge: HOME | End: 2025-08-19
Payer: MEDICARE

## 2025-08-19 VITALS
DIASTOLIC BLOOD PRESSURE: 63 MMHG | RESPIRATION RATE: 18 BRPM | SYSTOLIC BLOOD PRESSURE: 140 MMHG | TEMPERATURE: 97.5 F | OXYGEN SATURATION: 97 % | HEART RATE: 71 BPM

## 2025-08-19 VITALS
OXYGEN SATURATION: 98 % | HEART RATE: 78 BPM | BODY MASS INDEX: 30.48 KG/M2 | DIASTOLIC BLOOD PRESSURE: 78 MMHG | SYSTOLIC BLOOD PRESSURE: 132 MMHG | RESPIRATION RATE: 20 BRPM | WEIGHT: 182.6 LBS | TEMPERATURE: 97.5 F

## 2025-08-19 VITALS
BODY MASS INDEX: 31.24 KG/M2 | DIASTOLIC BLOOD PRESSURE: 63 MMHG | HEIGHT: 64 IN | SYSTOLIC BLOOD PRESSURE: 140 MMHG | RESPIRATION RATE: 18 BRPM | WEIGHT: 183 LBS | HEART RATE: 71 BPM | OXYGEN SATURATION: 97 %

## 2025-08-19 DIAGNOSIS — D64.9 ANEMIA, UNSPECIFIED TYPE: ICD-10-CM

## 2025-08-19 DIAGNOSIS — C61 MALIGNANT NEOPLASM OF PROSTATE (HCC): Primary | ICD-10-CM

## 2025-08-19 DIAGNOSIS — C61 MALIGNANT NEOPLASM OF PROSTATE (HCC): ICD-10-CM

## 2025-08-19 DIAGNOSIS — D64.9 ANEMIA, UNSPECIFIED TYPE: Primary | ICD-10-CM

## 2025-08-19 LAB
ALBUMIN SERPL BCG-MCNC: 4.3 G/DL (ref 3.4–4.9)
ALP SERPL-CCNC: 98 U/L (ref 40–129)
ALT SERPL W/O P-5'-P-CCNC: 14 U/L (ref 10–50)
AST SERPL-CCNC: 28 U/L (ref 10–50)
BASOPHILS # BLD AUTO: 0 THOU/MM3 (ref 0–0.1)
BASOPHILS NFR BLD AUTO: 1 % (ref 0–3)
BILIRUB CONJ SERPL-MCNC: 0.4 MG/DL (ref 0–0.2)
BILIRUB SERPL-MCNC: 0.9 MG/DL (ref 0.3–1.2)
BUN BLDP-MCNC: 11 MG/DL (ref 8–26)
CHLORIDE BLD-SCNC: 97 MEQ/L (ref 98–109)
CREAT BLD-MCNC: 0.8 MG/DL (ref 0.5–1.2)
EOSINOPHIL # BLD AUTO: 0.1 THOU/MM3 (ref 0–0.4)
EOSINOPHIL NFR BLD AUTO: 3 % (ref 0–4)
ERYTHROCYTE [DISTWIDTH] IN BLOOD BY AUTOMATED COUNT: 13.8 % (ref 11.5–14.5)
FERRITIN SERPL IA-MCNC: 334 NG/ML (ref 30–400)
GFR SERPL CREATININE-BSD FRML MDRD: > 90 ML/MIN/1.73M2
GLUCOSE BLD-MCNC: 144 MG/DL (ref 70–108)
HCT VFR BLD AUTO: 34.7 % (ref 42–52)
HGB BLD-MCNC: 12.4 GM/DL (ref 14–18)
IMM GRANULOCYTES # BLD AUTO: 0.27 THOU/MM3 (ref 0–0.07)
IMM GRANULOCYTES NFR BLD AUTO: 5 %
IONIZED CALCIUM, WHOLE BLOOD: 1.16 MMOL/L (ref 1.12–1.32)
IRON SATN MFR SERPL: 41 % (ref 20–50)
IRON SERPL-MCNC: 116 UG/DL (ref 61–157)
LYMPHOCYTES # BLD AUTO: 0.5 THOU/MM3 (ref 1–4.8)
LYMPHOCYTES NFR BLD AUTO: 9 % (ref 15–47)
MCH RBC QN AUTO: 32.1 PG (ref 26–33)
MCHC RBC AUTO-ENTMCNC: 35.7 GM/DL (ref 32.2–35.5)
MCV RBC AUTO: 90 FL (ref 80–94)
MONOCYTES # BLD AUTO: 0.4 THOU/MM3 (ref 0.4–1.3)
MONOCYTES NFR BLD AUTO: 7 % (ref 0–12)
NEUTROPHILS ABSOLUTE: 4.3 THOU/MM3 (ref 1.8–7.7)
NEUTROPHILS NFR BLD AUTO: 77 % (ref 43–75)
PLATELET # BLD AUTO: 184 THOU/MM3 (ref 130–400)
PMV BLD AUTO: 8.2 FL (ref 9.4–12.4)
POTASSIUM BLD-SCNC: 3.6 MEQ/L (ref 3.5–4.9)
PROT SERPL-MCNC: 7.3 G/DL (ref 6.4–8.3)
RBC # BLD AUTO: 3.86 MILL/MM3 (ref 4.7–6.1)
SODIUM BLD-SCNC: 137 MEQ/L (ref 138–146)
TIBC SERPL-MCNC: 282 UG/DL (ref 171–450)
TOTAL CO2, WHOLE BLOOD: 30 MEQ/L (ref 23–33)
WBC # BLD AUTO: 5.7 THOU/MM3 (ref 4.8–10.8)

## 2025-08-19 PROCEDURE — 80047 BASIC METABLC PNL IONIZED CA: CPT

## 2025-08-19 PROCEDURE — 82728 ASSAY OF FERRITIN: CPT

## 2025-08-19 PROCEDURE — 99211 OFF/OP EST MAY X REQ PHY/QHP: CPT

## 2025-08-19 PROCEDURE — 83540 ASSAY OF IRON: CPT

## 2025-08-19 PROCEDURE — 85025 COMPLETE CBC W/AUTO DIFF WBC: CPT

## 2025-08-19 PROCEDURE — 80076 HEPATIC FUNCTION PANEL: CPT

## 2025-08-19 PROCEDURE — 1159F MED LIST DOCD IN RCRD: CPT | Performed by: STUDENT IN AN ORGANIZED HEALTH CARE EDUCATION/TRAINING PROGRAM

## 2025-08-19 PROCEDURE — 1123F ACP DISCUSS/DSCN MKR DOCD: CPT | Performed by: STUDENT IN AN ORGANIZED HEALTH CARE EDUCATION/TRAINING PROGRAM

## 2025-08-19 PROCEDURE — 83550 IRON BINDING TEST: CPT

## 2025-08-19 PROCEDURE — 99214 OFFICE O/P EST MOD 30 MIN: CPT | Performed by: STUDENT IN AN ORGANIZED HEALTH CARE EDUCATION/TRAINING PROGRAM

## 2025-08-19 ASSESSMENT — ENCOUNTER SYMPTOMS
RECTAL PAIN: 0
CONSTIPATION: 0
COUGH: 0
BLOOD IN STOOL: 0
SHORTNESS OF BREATH: 0
DIARRHEA: 0
ABDOMINAL PAIN: 0